# Patient Record
Sex: MALE | Race: WHITE | NOT HISPANIC OR LATINO | URBAN - METROPOLITAN AREA
[De-identification: names, ages, dates, MRNs, and addresses within clinical notes are randomized per-mention and may not be internally consistent; named-entity substitution may affect disease eponyms.]

---

## 2020-07-25 ENCOUNTER — TELEPHONE ENCOUNTER (OUTPATIENT)
Dept: URBAN - METROPOLITAN AREA CLINIC 13 | Facility: CLINIC | Age: 65
End: 2020-07-25

## 2020-07-25 RX ORDER — SULFAMETHOXAZOLE AND TRIMETHOPRIM 800; 160 MG/1; MG/1
TAKE 1 TABLET TWICE DAILY UNTIL FINISHED TABLET ORAL
Qty: 20 | Refills: 0 | OUTPATIENT
Start: 2018-06-19 | End: 2018-07-27

## 2020-07-25 RX ORDER — SODIUM SULFATE, POTASSIUM SULFATE, MAGNESIUM SULFATE 17.5; 3.13; 1.6 G/ML; G/ML; G/ML
5PM THE DAY BEFORE PROCEDURE, DRINK 1/2 OF PREP, THEN 6HOURS BEFORE PROCEDURE DRINK REMAINDER OF PREP SOLUTION, CONCENTRATE ORAL
Qty: 1 | Refills: 0 | OUTPATIENT
Start: 2018-12-11 | End: 2019-03-12

## 2020-07-25 RX ORDER — ETANERCEPT 50 MG/ML
INJECT WEEKLY SOLUTION SUBCUTANEOUS
Qty: 4 | Refills: 0 | OUTPATIENT
Start: 2014-10-09 | End: 2018-06-19

## 2020-07-26 ENCOUNTER — TELEPHONE ENCOUNTER (OUTPATIENT)
Dept: URBAN - METROPOLITAN AREA CLINIC 13 | Facility: CLINIC | Age: 65
End: 2020-07-26

## 2020-07-26 RX ORDER — TAMSULOSIN HYDROCHLORIDE 0.4 MG/1
CAPSULE ORAL
Qty: 30 | Refills: 0 | Status: ACTIVE | COMMUNITY
Start: 2018-08-11

## 2020-07-26 RX ORDER — METAXALONE 800 MG/1
TABLET ORAL
Qty: 60 | Refills: 0 | Status: ACTIVE | COMMUNITY
Start: 2015-03-13

## 2020-07-26 RX ORDER — MELOXICAM 15 MG/1
TABLET ORAL
Qty: 90 | Refills: 0 | Status: ACTIVE | COMMUNITY
Start: 2015-03-07

## 2020-07-26 RX ORDER — OXYCODONE AND ACETAMINOPHEN 5; 325 MG/1; MG/1
TABLET ORAL
Qty: 20 | Refills: 0 | Status: ACTIVE | COMMUNITY
Start: 2018-05-10

## 2020-07-26 RX ORDER — AMLODIPINE BESYLATE AND BENAZEPRIL HYDROCHLORIDE 5; 10 MG/1; MG/1
TAKE 1 CAPSULE DAILY CAPSULE ORAL
Refills: 0 | Status: ACTIVE | COMMUNITY

## 2020-07-26 RX ORDER — CEFDINIR 300 MG/1
CAPSULE ORAL
Qty: 14 | Refills: 0 | Status: ACTIVE | COMMUNITY
Start: 2014-08-22

## 2020-07-26 RX ORDER — CLONAZEPAM 0.5 MG/1
TABLET ORAL
Qty: 30 | Refills: 0 | Status: ACTIVE | COMMUNITY
Start: 2019-03-18

## 2020-07-26 RX ORDER — FLUTICASONE PROPIONATE 50 UG/1
SPRAY, METERED NASAL
Qty: 16 | Refills: 0 | Status: ACTIVE | COMMUNITY
Start: 2014-08-22

## 2020-07-26 RX ORDER — MULTIVITAMIN WITH IRON
TAKE 1 TABLET DAILY, DOSAGE UNKNOWN PER PATIENT TABLET ORAL
Refills: 0 | Status: ACTIVE | COMMUNITY

## 2020-07-26 RX ORDER — BUPROPION HYDROCHLORIDE 300 MG/1
TAKE 1 TABLET DAILY TABLET, EXTENDED RELEASE ORAL
Refills: 0 | Status: ACTIVE | COMMUNITY

## 2020-07-26 RX ORDER — FAMOTIDINE 40 MG/1
TAKE 1 TABLET AT BEDTIME TABLET ORAL
Qty: 90 | Refills: 3 | Status: ACTIVE | COMMUNITY
Start: 2019-09-23

## 2020-07-26 RX ORDER — MELOXICAM 7.5 MG/1
TAKE 1 TABLET DAILY AS NEEDED TABLET ORAL
Refills: 0 | Status: ACTIVE | COMMUNITY

## 2020-07-26 RX ORDER — TAMSULOSIN HYDROCHLORIDE 0.4 MG/1
CAPSULE ORAL
Qty: 30 | Refills: 0 | Status: ACTIVE | COMMUNITY
Start: 2018-07-15

## 2020-07-26 RX ORDER — LEVOFLOXACIN 500 MG/1
TABLET, FILM COATED ORAL
Qty: 3 | Refills: 0 | Status: ACTIVE | COMMUNITY
Start: 2018-02-08

## 2020-07-26 RX ORDER — CYCLOBENZAPRINE HYDROCHLORIDE 10 MG/1
TABLET, FILM COATED ORAL
Qty: 60 | Refills: 0 | Status: ACTIVE | COMMUNITY
Start: 2015-05-05

## 2020-07-26 RX ORDER — CIPROFLOXACIN HYDROCHLORIDE 500 MG/1
TABLET, FILM COATED ORAL
Qty: 7 | Refills: 0 | Status: ACTIVE | COMMUNITY
Start: 2018-05-10

## 2020-07-26 RX ORDER — MUPIROCIN 20 MG/G
OINTMENT TOPICAL
Qty: 22 | Refills: 0 | Status: ACTIVE | COMMUNITY
Start: 2018-03-02

## 2020-07-26 RX ORDER — NAPROXEN 500 MG/1
TABLET, DELAYED RELEASE ORAL
Qty: 60 | Refills: 0 | Status: ACTIVE | COMMUNITY
Start: 2015-02-09

## 2020-07-26 RX ORDER — TAMSULOSIN HYDROCHLORIDE 0.4 MG/1
CAPSULE ORAL
Qty: 30 | Refills: 0 | Status: ACTIVE | COMMUNITY
Start: 2018-09-12

## 2020-07-26 RX ORDER — ATORVASTATIN CALCIUM 20 MG/1
TAKE 1 TABLET DAILY TABLET, FILM COATED ORAL
Refills: 0 | Status: ACTIVE | COMMUNITY
Start: 2014-09-04

## 2020-07-26 RX ORDER — CLONAZEPAM 1 MG/1
TAKE 1/2 TABLET TWICE DAILY AS NEEDED TABLET ORAL
Refills: 0 | Status: ACTIVE | COMMUNITY

## 2020-07-26 RX ORDER — HYDROCODONE BITARTRATE AND ACETAMINOPHEN 7.5; 325 MG/1; MG/1
TAKE 1 TABLET EVERY 4 TO 6 HOURS AS NEEDED FOR PAIN TABLET ORAL
Refills: 0 | Status: ACTIVE | COMMUNITY
Start: 2014-10-05

## 2020-07-26 RX ORDER — CIPROFLOXACIN HYDROCHLORIDE 500 MG/1
TABLET, FILM COATED ORAL
Qty: 60 | Refills: 0 | Status: ACTIVE | COMMUNITY
Start: 2017-12-22

## 2020-07-26 RX ORDER — TAMSULOSIN HYDROCHLORIDE 0.4 MG/1
CAPSULE ORAL
Qty: 30 | Refills: 0 | Status: ACTIVE | COMMUNITY
Start: 2018-06-18

## 2020-07-26 RX ORDER — SULFAMETHOXAZOLE AND TRIMETHOPRIM 800; 160 MG/1; MG/1
TABLET ORAL
Qty: 20 | Refills: 0 | Status: ACTIVE | COMMUNITY
Start: 2018-02-28

## 2021-04-20 ENCOUNTER — TELEPHONE ENCOUNTER (OUTPATIENT)
Dept: URBAN - METROPOLITAN AREA CLINIC 113 | Facility: CLINIC | Age: 66
End: 2021-04-20

## 2021-07-08 ENCOUNTER — WEB ENCOUNTER (OUTPATIENT)
Dept: URBAN - METROPOLITAN AREA CLINIC 113 | Facility: CLINIC | Age: 66
End: 2021-07-08

## 2021-07-08 ENCOUNTER — OFFICE VISIT (OUTPATIENT)
Dept: URBAN - METROPOLITAN AREA CLINIC 113 | Facility: CLINIC | Age: 66
End: 2021-07-08
Payer: MEDICARE

## 2021-07-08 VITALS
SYSTOLIC BLOOD PRESSURE: 135 MMHG | DIASTOLIC BLOOD PRESSURE: 80 MMHG | HEART RATE: 89 BPM | TEMPERATURE: 98.2 F | HEIGHT: 71 IN | BODY MASS INDEX: 25.34 KG/M2 | RESPIRATION RATE: 18 BRPM | WEIGHT: 181 LBS

## 2021-07-08 DIAGNOSIS — D49.0 IPMN (INTRADUCTAL PAPILLARY MUCINOUS NEOPLASM): ICD-10-CM

## 2021-07-08 DIAGNOSIS — Z80.0 FAMILY HISTORY OF PANCREATIC CANCER: ICD-10-CM

## 2021-07-08 DIAGNOSIS — Z86.010 HISTORY OF COLON POLYPS: ICD-10-CM

## 2021-07-08 DIAGNOSIS — K21.00 GASTROESOPHAGEAL REFLUX DISEASE WITH ESOPHAGITIS WITHOUT HEMORRHAGE: ICD-10-CM

## 2021-07-08 PROCEDURE — 99213 OFFICE O/P EST LOW 20 MIN: CPT | Performed by: INTERNAL MEDICINE

## 2021-07-08 RX ORDER — HYDROCODONE BITARTRATE AND ACETAMINOPHEN 7.5; 325 MG/1; MG/1
TAKE 1 TABLET EVERY 4 TO 6 HOURS AS NEEDED FOR PAIN TABLET ORAL
Refills: 0 | Status: ACTIVE | COMMUNITY
Start: 2014-10-05

## 2021-07-08 RX ORDER — AMLODIPINE BESYLATE AND BENAZEPRIL HYDROCHLORIDE 5; 10 MG/1; MG/1
TAKE 1 CAPSULE DAILY CAPSULE ORAL
Refills: 0 | Status: ACTIVE | COMMUNITY

## 2021-07-08 RX ORDER — MELOXICAM 7.5 MG/1
TAKE 1 TABLET DAILY AS NEEDED TABLET ORAL
Refills: 0 | Status: ACTIVE | COMMUNITY

## 2021-07-08 RX ORDER — CIPROFLOXACIN HYDROCHLORIDE 500 MG/1
TABLET, FILM COATED ORAL
Qty: 60 | Refills: 0 | Status: ON HOLD | COMMUNITY
Start: 2017-12-22

## 2021-07-08 RX ORDER — NAPROXEN 500 MG/1
TABLET, DELAYED RELEASE ORAL
Qty: 60 | Refills: 0 | Status: ON HOLD | COMMUNITY
Start: 2015-02-09

## 2021-07-08 RX ORDER — FAMOTIDINE 40 MG/1
TAKE 1 TABLET AT BEDTIME TABLET ORAL
Qty: 90 | Refills: 3 | Status: ACTIVE | COMMUNITY
Start: 2019-09-23

## 2021-07-08 RX ORDER — MELOXICAM 15 MG/1
TABLET ORAL
Qty: 90 | Refills: 0 | Status: ON HOLD | COMMUNITY
Start: 2015-03-07

## 2021-07-08 RX ORDER — SULFAMETHOXAZOLE AND TRIMETHOPRIM 800; 160 MG/1; MG/1
TABLET ORAL
Qty: 20 | Refills: 0 | Status: ON HOLD | COMMUNITY
Start: 2018-02-28

## 2021-07-08 RX ORDER — ATORVASTATIN CALCIUM 20 MG/1
TAKE 1 TABLET DAILY TABLET, FILM COATED ORAL
Refills: 0 | Status: ACTIVE | COMMUNITY
Start: 2014-09-04

## 2021-07-08 RX ORDER — MUPIROCIN 20 MG/G
OINTMENT TOPICAL
Qty: 22 | Refills: 0 | Status: ON HOLD | COMMUNITY
Start: 2018-03-02

## 2021-07-08 RX ORDER — CLONAZEPAM 0.5 MG/1
TABLET ORAL
Qty: 30 | Refills: 0 | Status: ON HOLD | COMMUNITY
Start: 2019-03-18

## 2021-07-08 RX ORDER — MULTIVITAMIN WITH IRON
TAKE 1 TABLET DAILY, DOSAGE UNKNOWN PER PATIENT TABLET ORAL
Refills: 0 | Status: ACTIVE | COMMUNITY

## 2021-07-08 RX ORDER — BUPROPION HYDROCHLORIDE 300 MG/1
TAKE 1 TABLET DAILY TABLET, EXTENDED RELEASE ORAL
Refills: 0 | Status: ACTIVE | COMMUNITY

## 2021-07-08 RX ORDER — METAXALONE 800 MG/1
TABLET ORAL
Qty: 60 | Refills: 0 | Status: ON HOLD | COMMUNITY
Start: 2015-03-13

## 2021-07-08 RX ORDER — LEVOFLOXACIN 500 MG/1
TABLET, FILM COATED ORAL
Qty: 3 | Refills: 0 | Status: ON HOLD | COMMUNITY
Start: 2018-02-08

## 2021-07-08 RX ORDER — FLUTICASONE PROPIONATE 50 UG/1
SPRAY, METERED NASAL
Qty: 16 | Refills: 0 | Status: ACTIVE | COMMUNITY
Start: 2014-08-22

## 2021-07-08 RX ORDER — TAMSULOSIN HYDROCHLORIDE 0.4 MG/1
CAPSULE ORAL
Qty: 30 | Refills: 0 | Status: ON HOLD | COMMUNITY
Start: 2018-06-18

## 2021-07-08 RX ORDER — CEFDINIR 300 MG/1
CAPSULE ORAL
Qty: 14 | Refills: 0 | Status: ON HOLD | COMMUNITY
Start: 2014-08-22

## 2021-07-08 RX ORDER — CLONAZEPAM 1 MG/1
TAKE 1/2 TABLET TWICE DAILY AS NEEDED TABLET ORAL
Refills: 0 | Status: ON HOLD | COMMUNITY

## 2021-07-08 RX ORDER — CYCLOBENZAPRINE HYDROCHLORIDE 10 MG/1
TABLET, FILM COATED ORAL
Qty: 60 | Refills: 0 | Status: ON HOLD | COMMUNITY
Start: 2015-05-05

## 2021-07-08 RX ORDER — OXYCODONE AND ACETAMINOPHEN 5; 325 MG/1; MG/1
TABLET ORAL
Qty: 20 | Refills: 0 | Status: ACTIVE | COMMUNITY
Start: 2018-05-10

## 2021-07-08 NOTE — HPI-TODAY'S VISIT:
The patient is a very pleasant 65-year-old gentleman who I last saw in March 2019 in the office.  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  I had also followed him for screening colonoscopy.  Last colonoscopy was March 2019.  This was for history of colon polyps.  This revealed a diminutive descending colon polyp and diverticulosis.  Recommendation was for a 5-year follow-up which would be in 2024.  Last upper endoscopy was in July 2018.  This was for dysphagia.  This revealed grade B reflux esophagitis otherwise unremarkable exam.  MRI in March 2019 revealed a 1.6 cm and a 1.5 cm pancreatic cyst.  He had repeat MRI in May of this year.  This revealed a 2.1 cm pancreatic cyst.  And another 12 mm pancreatic cyst.  These were felt to be stable compared to the prior MRI. From a gastrointestinal standpoint the patient is doing well.  His only issues have been neck issues.  He is undergone surgery on his neck.  He seemed to undergo physical therapy in this area.  His mother was diagnosed with pancreatic adenocarcinoma which she passed from.  He is very concerned about this.  We discussed the possibilities in patients of having both IPMN and pancreatic adenocarcinoma as they are both quite common.

## 2021-07-08 NOTE — EXAM-PHYSICAL EXAM
He is alert and oriented to person place and situation no acute distress.  There is no scleral icterus.

## 2021-09-03 ENCOUNTER — ERX REFILL RESPONSE (OUTPATIENT)
Dept: URBAN - METROPOLITAN AREA CLINIC 113 | Facility: CLINIC | Age: 66
End: 2021-09-03

## 2021-09-03 RX ORDER — FAMOTIDINE 40 MG/1
TAKE 1 TABLET AT BEDTIME TABLET, FILM COATED ORAL
Qty: 90 TABLET | Refills: 0 | OUTPATIENT

## 2021-09-03 RX ORDER — FAMOTIDINE 40 MG/1
TAKE 1 TABLET AT BEDTIME TABLET, FILM COATED ORAL
Qty: 90 TABLET | Refills: 1 | OUTPATIENT

## 2021-10-29 ENCOUNTER — ERX REFILL RESPONSE (OUTPATIENT)
Dept: URBAN - METROPOLITAN AREA CLINIC 113 | Facility: CLINIC | Age: 66
End: 2021-10-29

## 2021-10-29 RX ORDER — FAMOTIDINE 40 MG/1
TAKE 1 TABLET AT BEDTIME TABLET, FILM COATED ORAL
Qty: 90 TABLET | Refills: 1 | OUTPATIENT

## 2021-12-01 ENCOUNTER — ERX REFILL RESPONSE (OUTPATIENT)
Dept: URBAN - METROPOLITAN AREA CLINIC 113 | Facility: CLINIC | Age: 66
End: 2021-12-01

## 2021-12-01 RX ORDER — FAMOTIDINE 40 MG/1
TAKE 1 TABLET AT BEDTIME TABLET, FILM COATED ORAL
Qty: 90 TABLET | Refills: 1 | OUTPATIENT

## 2022-03-21 ENCOUNTER — ERX REFILL RESPONSE (OUTPATIENT)
Dept: URBAN - METROPOLITAN AREA CLINIC 113 | Facility: CLINIC | Age: 67
End: 2022-03-21

## 2022-03-21 RX ORDER — FAMOTIDINE 40 MG/1
TAKE 1 TABLET AT BEDTIME TABLET, FILM COATED ORAL
Qty: 90 TABLET | Refills: 1 | OUTPATIENT

## 2022-04-26 ENCOUNTER — ERX REFILL RESPONSE (OUTPATIENT)
Dept: URBAN - METROPOLITAN AREA CLINIC 113 | Facility: CLINIC | Age: 67
End: 2022-04-26

## 2022-04-26 RX ORDER — FAMOTIDINE 40 MG/1
TAKE 1 TABLET AT BEDTIME TABLET, FILM COATED ORAL
Qty: 90 TABLET | Refills: 1 | OUTPATIENT

## 2022-07-05 ENCOUNTER — OFFICE VISIT (OUTPATIENT)
Dept: URBAN - METROPOLITAN AREA CLINIC 107 | Facility: CLINIC | Age: 67
End: 2022-07-05
Payer: MEDICARE

## 2022-07-05 VITALS
WEIGHT: 180 LBS | TEMPERATURE: 98.2 F | HEART RATE: 80 BPM | BODY MASS INDEX: 25.2 KG/M2 | HEIGHT: 71 IN | DIASTOLIC BLOOD PRESSURE: 85 MMHG | SYSTOLIC BLOOD PRESSURE: 145 MMHG

## 2022-07-05 DIAGNOSIS — K21.00 GASTROESOPHAGEAL REFLUX DISEASE WITH ESOPHAGITIS WITHOUT HEMORRHAGE: ICD-10-CM

## 2022-07-05 DIAGNOSIS — D49.0 IPMN (INTRADUCTAL PAPILLARY MUCINOUS NEOPLASM): ICD-10-CM

## 2022-07-05 DIAGNOSIS — Z80.0 FAMILY HISTORY OF PANCREATIC CANCER: ICD-10-CM

## 2022-07-05 DIAGNOSIS — Z86.010 HISTORY OF COLON POLYPS: ICD-10-CM

## 2022-07-05 PROCEDURE — 99203 OFFICE O/P NEW LOW 30 MIN: CPT | Performed by: NURSE PRACTITIONER

## 2022-07-05 RX ORDER — FAMOTIDINE 40 MG/1
TAKE 1 TABLET AT BEDTIME TABLET, FILM COATED ORAL
Qty: 90 TABLET | Refills: 1 | Status: ACTIVE | COMMUNITY

## 2022-07-05 RX ORDER — OXYCODONE AND ACETAMINOPHEN 5; 325 MG/1; MG/1
TABLET ORAL
Qty: 20 | Refills: 0 | Status: ACTIVE | COMMUNITY
Start: 2018-05-10

## 2022-07-05 RX ORDER — BUPROPION HYDROCHLORIDE 300 MG/1
TAKE 1 TABLET DAILY TABLET, EXTENDED RELEASE ORAL
Refills: 0 | Status: ACTIVE | COMMUNITY

## 2022-07-05 RX ORDER — MELOXICAM 7.5 MG/1
TAKE 1 TABLET DAILY AS NEEDED TABLET ORAL
Refills: 0 | Status: ACTIVE | COMMUNITY

## 2022-07-05 RX ORDER — ATORVASTATIN CALCIUM 20 MG/1
TAKE 1 TABLET DAILY TABLET, FILM COATED ORAL
Refills: 0 | Status: ACTIVE | COMMUNITY
Start: 2014-09-04

## 2022-07-05 RX ORDER — FLUTICASONE PROPIONATE 50 UG/1
SPRAY, METERED NASAL
Qty: 16 | Refills: 0 | Status: ACTIVE | COMMUNITY
Start: 2014-08-22

## 2022-07-05 RX ORDER — AMLODIPINE BESYLATE AND BENAZEPRIL HYDROCHLORIDE 5; 10 MG/1; MG/1
TAKE 1 CAPSULE DAILY CAPSULE ORAL
Refills: 0 | Status: ACTIVE | COMMUNITY

## 2022-07-05 NOTE — HPI-TODAY'S VISIT:
This is a 66-year-old male with a history of hypertension, hyperlipidemia, psoriatic arthritis, prostate cancer, pancreatic IPMN's (initially found 2015), and adenomatous colon polyps due surveillance in March 2024 presenting for evaluation of abdominal pain. He was last seen in the office 3/27/2019.  IPMN's were stable on imaging.  He had recently undergone a surveillance colonoscopy demonstrating sigmoid diverticulosis and removal of a 3 mm hyperplastic polyp.  Due to his prior history of adenomas, surveillance was recommended in 5 years.  He states he is doing well.  He had a sensation of "squeezing inside and hurting" in his chest and upper body lasting 2 days and spontaneously resolving.  He also reports a chest x-ray that was performed prior to a recent MRI.  His lungs were clear. He reports a nuclear stress test 4 years ago notable for mild vessel disease for which he has been instructed to take low-dose aspirin.  This symptom has not recurred.  GERD is controlled with famotidine 40 mg at bedtime.  He denies any abdominal symptoms.

## 2022-07-05 NOTE — HPI-OTHER HISTORIES
Labs 6/10/2022:CBC: WBC 5.4, hemoglobin 16.3, MCV 88, platelet 196.  D-dimer 0.62. MRI of the abdomen with and without contrast 6/30/2022:Stable pancreatic cystic lesions including the 2 dominant lobulated and septated cystic lesions in the pancreatic body followed by the pancreatic head as previously delineated on the abdominal MRI of 5/14/2021.  No overtly concerning postcontrast features.  No infiltrative morphology, main duct dilation, acute pancreatitis, biliary ductal obstruction, or findings concerning for metastatic disease.  Benign-appearing bilateral renal cysts are identified, the dominant Bosniak type I cyst in the left kidney currently measures 2.6 cm, previously 2.3 cm.  Chronic ancillary findings: Minimal hepatic steatosis, age-related degenerative changes of the osseous structures, convex right lumbar scoliosis with multilevel signal changes. Colonoscopy 3/12/2019:BB PS 6, removal of a 3 mm descending sessile hyperplastic polyp, sigmoid diverticulosis. EGD 7/27/2018:Normal duodenum, normal stomach, LA grade B reflux esophagitis.

## 2022-07-09 ENCOUNTER — TELEPHONE ENCOUNTER (OUTPATIENT)
Dept: URBAN - METROPOLITAN AREA CLINIC 113 | Facility: CLINIC | Age: 67
End: 2022-07-09

## 2022-09-27 ENCOUNTER — TELEPHONE ENCOUNTER (OUTPATIENT)
Dept: URBAN - METROPOLITAN AREA CLINIC 113 | Facility: CLINIC | Age: 67
End: 2022-09-27

## 2022-10-11 ENCOUNTER — LAB OUTSIDE AN ENCOUNTER (OUTPATIENT)
Dept: URBAN - METROPOLITAN AREA CLINIC 113 | Facility: CLINIC | Age: 67
End: 2022-10-11

## 2022-10-11 ENCOUNTER — CLAIMS CREATED FROM THE CLAIM WINDOW (OUTPATIENT)
Dept: URBAN - METROPOLITAN AREA CLINIC 113 | Facility: CLINIC | Age: 67
End: 2022-10-11
Payer: MEDICARE

## 2022-10-11 ENCOUNTER — CLAIMS CREATED FROM THE CLAIM WINDOW (OUTPATIENT)
Dept: URBAN - METROPOLITAN AREA CLINIC 113 | Facility: CLINIC | Age: 67
End: 2022-10-11

## 2022-10-11 ENCOUNTER — OFFICE VISIT (OUTPATIENT)
Dept: URBAN - METROPOLITAN AREA CLINIC 113 | Facility: CLINIC | Age: 67
End: 2022-10-11

## 2022-10-11 VITALS
WEIGHT: 184 LBS | RESPIRATION RATE: 20 BRPM | SYSTOLIC BLOOD PRESSURE: 114 MMHG | DIASTOLIC BLOOD PRESSURE: 71 MMHG | BODY MASS INDEX: 25.76 KG/M2 | TEMPERATURE: 98 F | HEART RATE: 77 BPM | HEIGHT: 71 IN

## 2022-10-11 DIAGNOSIS — D49.0 IPMN (INTRADUCTAL PAPILLARY MUCINOUS NEOPLASM): ICD-10-CM

## 2022-10-11 DIAGNOSIS — K21.00 GASTROESOPHAGEAL REFLUX DISEASE WITH ESOPHAGITIS WITHOUT HEMORRHAGE: ICD-10-CM

## 2022-10-11 DIAGNOSIS — Z86.010 HISTORY OF COLON POLYPS: ICD-10-CM

## 2022-10-11 DIAGNOSIS — R19.8 RECTAL PRESSURE: ICD-10-CM

## 2022-10-11 DIAGNOSIS — R10.32 LLQ DISCOMFORT: ICD-10-CM

## 2022-10-11 DIAGNOSIS — D49.0 ANAL NEOPLASM: ICD-10-CM

## 2022-10-11 DIAGNOSIS — K64.4 ANAL SKIN TAG: ICD-10-CM

## 2022-10-11 PROBLEM — 429000004: Status: ACTIVE | Noted: 2022-07-05

## 2022-10-11 PROCEDURE — 99213 OFFICE O/P EST LOW 20 MIN: CPT

## 2022-10-11 RX ORDER — AMLODIPINE BESYLATE AND BENAZEPRIL HYDROCHLORIDE 5; 10 MG/1; MG/1
TAKE 1 CAPSULE DAILY CAPSULE ORAL
Refills: 0 | Status: ACTIVE | COMMUNITY

## 2022-10-11 RX ORDER — FLUTICASONE PROPIONATE 50 UG/1
SPRAY, METERED NASAL
Qty: 16 | Refills: 0 | Status: ACTIVE | COMMUNITY
Start: 2014-08-22

## 2022-10-11 RX ORDER — BUPROPION HYDROCHLORIDE 300 MG/1
TAKE 1 TABLET DAILY TABLET, EXTENDED RELEASE ORAL
Refills: 0 | Status: ACTIVE | COMMUNITY

## 2022-10-11 RX ORDER — FAMOTIDINE 40 MG/1
TAKE 1 TABLET AT BEDTIME TABLET, FILM COATED ORAL
Qty: 90 TABLET | Refills: 1 | Status: ACTIVE | COMMUNITY

## 2022-10-11 RX ORDER — OXYCODONE AND ACETAMINOPHEN 5; 325 MG/1; MG/1
TABLET ORAL
Qty: 20 | Refills: 0 | Status: ON HOLD | COMMUNITY
Start: 2018-05-10

## 2022-10-11 RX ORDER — MELOXICAM 7.5 MG/1
TAKE 1 TABLET DAILY AS NEEDED TABLET ORAL
Refills: 0 | Status: ACTIVE | COMMUNITY

## 2022-10-11 RX ORDER — ATORVASTATIN CALCIUM 20 MG/1
TAKE 1 TABLET DAILY TABLET, FILM COATED ORAL
Refills: 0 | Status: ACTIVE | COMMUNITY
Start: 2014-09-04

## 2022-10-11 NOTE — PHYSICAL EXAM HENT:
Head, normocephalic, atraumatic, Face, Face within normal limits, Ears, External ears within normal limits Referred To Asc For Closure Text (Leave Blank If You Do Not Want): After obtaining clear surgical margins the patient was sent to an ASC for surgical repair.  The patient understands they will receive post-surgical care and follow-up from the ASC physician.

## 2022-10-11 NOTE — HPI-TODAY'S VISIT:
This is a 66-year-old male with a history of hypertension, hyperlipidemia, psoriatic arthritis, prostate cancer, pancreatic IPMN's (initially found 2015), and adenomatous colon polyps due surveillance in March 2024 presenting for evaluation of rectal discomfort. . He was last seen in the office 7/5/22 his GERD was controlled with famotidine 40 mg at bedtime. Pancreatic cystic lesions determined likely to be IPMN's. Recent MRI demonstrated minimal increase (2 mm) of 2 dominant lesions. Radiology has interpreted stable findings. Recommend repeat MRI in 1 year. Today he presents with 4 weeks of  an "odd feeling in rectum or something stuck in rectum". He does not have any pain, just discomfort. The sensation happens multiple times a day and will improve after a bowel movement, but always comes back. He reports no constipation or straining with bowel movements 2-3 times per day. Denies any blood noted in stool or on paper. He did have prostate cancer and radiation treatment back in 2018. He has never experienced this sensation before. He also reports intermittent LLQ pressure. This sensation seems to happen occasionally prior to bowel movements. There is no pain, just some discomfort.   Interval History.   IPMN's were stable on imaging.  He had recently undergone a surveillance colonoscopy in 2017 demonstrating sigmoid diverticulosis and removal of a 3 mm hyperplastic polyp.  Due to his prior history of adenomas, surveillance was recommended in 5 years.

## 2022-10-17 ENCOUNTER — TELEPHONE ENCOUNTER (OUTPATIENT)
Dept: URBAN - METROPOLITAN AREA CLINIC 113 | Facility: CLINIC | Age: 67
End: 2022-10-17

## 2022-10-20 ENCOUNTER — ERX REFILL RESPONSE (OUTPATIENT)
Dept: URBAN - METROPOLITAN AREA CLINIC 113 | Facility: CLINIC | Age: 67
End: 2022-10-20

## 2022-10-20 RX ORDER — FAMOTIDINE 40 MG/1
TAKE 1 TABLET AT BEDTIME TABLET, FILM COATED ORAL
Qty: 90 TABLET | Refills: 1 | OUTPATIENT

## 2022-10-20 RX ORDER — FAMOTIDINE 40 MG/1
TAKE 1 TABLET AT BEDTIME (APPOINTMENT IS NEEDED FOR REFILLS) TABLET, FILM COATED ORAL
Qty: 90 TABLET | Refills: 3 | OUTPATIENT

## 2022-10-24 ENCOUNTER — OFFICE VISIT (OUTPATIENT)
Dept: URBAN - METROPOLITAN AREA SURGERY CENTER 25 | Facility: SURGERY CENTER | Age: 67
End: 2022-10-24
Payer: MEDICARE

## 2022-10-24 ENCOUNTER — CLAIMS CREATED FROM THE CLAIM WINDOW (OUTPATIENT)
Dept: URBAN - METROPOLITAN AREA CLINIC 4 | Facility: CLINIC | Age: 67
End: 2022-10-24
Payer: MEDICARE

## 2022-10-24 DIAGNOSIS — D12.5 BENIGN NEOPLASM OF SIGMOID COLON: ICD-10-CM

## 2022-10-24 DIAGNOSIS — D12.5 ADENOMA OF SIGMOID COLON: ICD-10-CM

## 2022-10-24 DIAGNOSIS — K62.89 RECTAL PAIN: ICD-10-CM

## 2022-10-24 PROCEDURE — 45338 SIGMOIDOSCOPY W/TUMR REMOVE: CPT | Performed by: INTERNAL MEDICINE

## 2022-10-24 PROCEDURE — G8907 PT DOC NO EVENTS ON DISCHARG: HCPCS | Performed by: INTERNAL MEDICINE

## 2022-10-24 PROCEDURE — 88305 TISSUE EXAM BY PATHOLOGIST: CPT | Performed by: PATHOLOGY

## 2022-10-24 RX ORDER — BUPROPION HYDROCHLORIDE 300 MG/1
TAKE 1 TABLET DAILY TABLET, EXTENDED RELEASE ORAL
Refills: 0 | Status: ACTIVE | COMMUNITY

## 2022-10-24 RX ORDER — AMLODIPINE BESYLATE AND BENAZEPRIL HYDROCHLORIDE 5; 10 MG/1; MG/1
TAKE 1 CAPSULE DAILY CAPSULE ORAL
Refills: 0 | Status: ACTIVE | COMMUNITY

## 2022-10-24 RX ORDER — OXYCODONE AND ACETAMINOPHEN 5; 325 MG/1; MG/1
TABLET ORAL
Qty: 20 | Refills: 0 | Status: ON HOLD | COMMUNITY
Start: 2018-05-10

## 2022-10-24 RX ORDER — FAMOTIDINE 40 MG/1
TAKE 1 TABLET AT BEDTIME (APPOINTMENT IS NEEDED FOR REFILLS) TABLET, FILM COATED ORAL
Qty: 90 TABLET | Refills: 3 | Status: ACTIVE | COMMUNITY

## 2022-10-24 RX ORDER — MELOXICAM 7.5 MG/1
TAKE 1 TABLET DAILY AS NEEDED TABLET ORAL
Refills: 0 | Status: ACTIVE | COMMUNITY

## 2022-10-24 RX ORDER — ATORVASTATIN CALCIUM 20 MG/1
TAKE 1 TABLET DAILY TABLET, FILM COATED ORAL
Refills: 0 | Status: ACTIVE | COMMUNITY
Start: 2014-09-04

## 2022-10-24 RX ORDER — FLUTICASONE PROPIONATE 50 UG/1
SPRAY, METERED NASAL
Qty: 16 | Refills: 0 | Status: ACTIVE | COMMUNITY
Start: 2014-08-22

## 2022-10-31 ENCOUNTER — TELEPHONE ENCOUNTER (OUTPATIENT)
Dept: URBAN - METROPOLITAN AREA CLINIC 113 | Facility: CLINIC | Age: 67
End: 2022-10-31

## 2022-12-06 ENCOUNTER — OFFICE VISIT (OUTPATIENT)
Dept: URBAN - METROPOLITAN AREA CLINIC 113 | Facility: CLINIC | Age: 67
End: 2022-12-06
Payer: MEDICARE

## 2022-12-06 ENCOUNTER — DASHBOARD ENCOUNTERS (OUTPATIENT)
Age: 67
End: 2022-12-06

## 2022-12-06 VITALS
HEIGHT: 71 IN | BODY MASS INDEX: 25.9 KG/M2 | HEART RATE: 83 BPM | TEMPERATURE: 97.5 F | SYSTOLIC BLOOD PRESSURE: 127 MMHG | RESPIRATION RATE: 16 BRPM | WEIGHT: 185 LBS | DIASTOLIC BLOOD PRESSURE: 80 MMHG

## 2022-12-06 DIAGNOSIS — K64.4 ANAL SKIN TAG: ICD-10-CM

## 2022-12-06 DIAGNOSIS — R10.32 LLQ DISCOMFORT: ICD-10-CM

## 2022-12-06 DIAGNOSIS — Z86.010 HISTORY OF COLON POLYPS: ICD-10-CM

## 2022-12-06 DIAGNOSIS — K21.00 GASTROESOPHAGEAL REFLUX DISEASE WITH ESOPHAGITIS WITHOUT HEMORRHAGE: ICD-10-CM

## 2022-12-06 DIAGNOSIS — R19.8 RECTAL PRESSURE: ICD-10-CM

## 2022-12-06 DIAGNOSIS — D49.0 IPMN (INTRADUCTAL PAPILLARY MUCINOUS NEOPLASM): ICD-10-CM

## 2022-12-06 DIAGNOSIS — D12.6 TUBULAR ADENOMA OF COLON: ICD-10-CM

## 2022-12-06 PROBLEM — 266433003: Status: ACTIVE | Noted: 2022-07-05

## 2022-12-06 PROBLEM — 92264007: Status: ACTIVE | Noted: 2022-07-05

## 2022-12-06 PROCEDURE — 99214 OFFICE O/P EST MOD 30 MIN: CPT

## 2022-12-06 RX ORDER — BUPROPION HYDROCHLORIDE 300 MG/1
TAKE 1 TABLET DAILY TABLET, EXTENDED RELEASE ORAL
Refills: 0 | Status: ACTIVE | COMMUNITY

## 2022-12-06 RX ORDER — AMLODIPINE BESYLATE AND BENAZEPRIL HYDROCHLORIDE 5; 10 MG/1; MG/1
TAKE 1 CAPSULE DAILY CAPSULE ORAL
Refills: 0 | Status: ACTIVE | COMMUNITY

## 2022-12-06 RX ORDER — FLUTICASONE PROPIONATE 50 UG/1
SPRAY, METERED NASAL
Qty: 16 | Refills: 0 | Status: ACTIVE | COMMUNITY
Start: 2014-08-22

## 2022-12-06 RX ORDER — TAMSULOSIN HYDROCHLORIDE 0.4 MG/1
1 CAPSULE CAPSULE ORAL ONCE A DAY
Status: ACTIVE | COMMUNITY

## 2022-12-06 RX ORDER — OXYCODONE AND ACETAMINOPHEN 5; 325 MG/1; MG/1
TABLET ORAL
Qty: 20 | Refills: 0 | Status: ON HOLD | COMMUNITY
Start: 2018-05-10

## 2022-12-06 RX ORDER — FAMOTIDINE 40 MG/1
TAKE 1 TABLET AT BEDTIME (APPOINTMENT IS NEEDED FOR REFILLS) TABLET, FILM COATED ORAL
Qty: 90 TABLET | Refills: 3 | Status: ACTIVE | COMMUNITY

## 2022-12-06 RX ORDER — ATORVASTATIN CALCIUM 20 MG/1
TAKE 1 TABLET DAILY TABLET, FILM COATED ORAL
Refills: 0 | Status: ACTIVE | COMMUNITY
Start: 2014-09-04

## 2022-12-06 RX ORDER — MELOXICAM 7.5 MG/1
TAKE 1 TABLET DAILY AS NEEDED TABLET ORAL
Refills: 0 | Status: ACTIVE | COMMUNITY

## 2022-12-06 NOTE — HPI-TODAY'S VISIT:
This is a 66-year-old male with a history of hypertension, hyperlipidemia, psoriatic arthritis, prostate cancer, pancreatic IPMN's (initially found 2015), and adenomatous colon polyps due surveillance in March 2024 presenting for evaluation of rectal discomfort. . He was last seen in the office 7/5/22 his GERD was controlled with famotidine 40 mg at bedtime. Pancreatic cystic lesions determined likely to be IPMN's. Recent MRI demonstrated minimal increase (2 mm) of 2 dominant lesions. Radiology has interpreted stable findings. Recommend repeat MRI in 1 year. Today he presents with 4 weeks of  an "odd feeling in rectum or something stuck in rectum". He does not have any pain, just discomfort. The sensation happens multiple times a day and will improve after a bowel movement, but always comes back. He reports no constipation or straining with bowel movements 2-3 times per day. Denies any blood noted in stool or on paper. He did have prostate cancer and radiation treatment back in 2018. He has never experienced this sensation before. He also reports intermittent LLQ pressure. This sensation seems to happen occasionally prior to bowel movements. There is no pain, just some discomfort.   Interval History.   IPMN's were stable on imaging.  He had recently undergone a surveillance colonoscopy in 2017 demonstrating sigmoid diverticulosis and removal of a 3 mm hyperplastic polyp.  Due to his prior history of adenomas, surveillance was recommended in 5 years.  Interval history: 67-year-old male presents for follow-up after flexible sigmoidoscopy.  He was last seen on 10/11/2022.  He reported rectal pressure and sensation of foreign object in his rectum ongoing for 4 weeks.  CATHRYN was notable for external skin tag.  He does have a notable history of prostate cancer status post radiation therapy in 2018.  He was recommended to increase fiber to 2 tablespoons daily.  He was scheduled for flexible sigmoidoscopy for further evaluation.  Flexible sigmoidoscopy 10/24/2022:Performed without difficulty.  Quality bowel prep was good.  Perianal digital rectal exams were normal.  Removal of a 6 mm polyp in the mid sigmoid colon.  Diverticulosis in the sigmoid colon.  Recommendations included performing a colonoscopy if polyp is adenomatous.  Pathology revealed a tubular adenoma.  A colonoscopy was recommended as there is a 30% chance of additional polyps throughout the remainder of the colon.  He was also recommended psyllium husk 2 tablespoons daily.  He disliked the psyllium husk. THis made his stools mushy and the texture was distasteful. He typically has 1-2 bowel movements daily. He has not experienced the rectal pressure since his flex sig. He denies abdominal pain, nausea, vomiting or dysphagia. GERD is well controlled. NO blood per rectum. His mother had pancreatic cancer.

## 2023-01-04 PROBLEM — 444898006: Status: ACTIVE | Noted: 2023-01-04

## 2023-01-04 PROBLEM — 428283002: Status: ACTIVE | Noted: 2021-07-08

## 2023-01-05 ENCOUNTER — TELEPHONE ENCOUNTER (OUTPATIENT)
Dept: URBAN - METROPOLITAN AREA CLINIC 113 | Facility: CLINIC | Age: 68
End: 2023-01-05

## 2023-01-19 ENCOUNTER — CLAIMS CREATED FROM THE CLAIM WINDOW (OUTPATIENT)
Dept: URBAN - METROPOLITAN AREA SURGERY CENTER 25 | Facility: SURGERY CENTER | Age: 68
End: 2023-01-19
Payer: MEDICARE

## 2023-01-19 ENCOUNTER — CLAIMS CREATED FROM THE CLAIM WINDOW (OUTPATIENT)
Dept: URBAN - METROPOLITAN AREA CLINIC 4 | Facility: CLINIC | Age: 68
End: 2023-01-19
Payer: MEDICARE

## 2023-01-19 ENCOUNTER — CLAIMS CREATED FROM THE CLAIM WINDOW (OUTPATIENT)
Dept: URBAN - METROPOLITAN AREA SURGERY CENTER 25 | Facility: SURGERY CENTER | Age: 68
End: 2023-01-19

## 2023-01-19 DIAGNOSIS — K63.89 OTHER SPECIFIED DISEASES OF INTESTINE: ICD-10-CM

## 2023-01-19 DIAGNOSIS — Z86.010 ADENOMAS PERSONAL HISTORY OF COLONIC POLYPS: ICD-10-CM

## 2023-01-19 PROCEDURE — 45385 COLONOSCOPY W/LESION REMOVAL: CPT | Performed by: INTERNAL MEDICINE

## 2023-01-19 PROCEDURE — G8907 PT DOC NO EVENTS ON DISCHARG: HCPCS | Performed by: INTERNAL MEDICINE

## 2023-01-19 PROCEDURE — 88305 TISSUE EXAM BY PATHOLOGIST: CPT | Performed by: PATHOLOGY

## 2023-01-19 RX ORDER — ATORVASTATIN CALCIUM 20 MG/1
TAKE 1 TABLET DAILY TABLET, FILM COATED ORAL
Refills: 0 | Status: ACTIVE | COMMUNITY
Start: 2014-09-04

## 2023-01-19 RX ORDER — AMLODIPINE BESYLATE AND BENAZEPRIL HYDROCHLORIDE 5; 10 MG/1; MG/1
TAKE 1 CAPSULE DAILY CAPSULE ORAL
Refills: 0 | Status: ACTIVE | COMMUNITY

## 2023-01-19 RX ORDER — BUPROPION HYDROCHLORIDE 300 MG/1
TAKE 1 TABLET DAILY TABLET, EXTENDED RELEASE ORAL
Refills: 0 | Status: ACTIVE | COMMUNITY

## 2023-01-19 RX ORDER — TAMSULOSIN HYDROCHLORIDE 0.4 MG/1
1 CAPSULE CAPSULE ORAL ONCE A DAY
Status: ACTIVE | COMMUNITY

## 2023-01-19 RX ORDER — OXYCODONE AND ACETAMINOPHEN 5; 325 MG/1; MG/1
TABLET ORAL
Qty: 20 | Refills: 0 | Status: ON HOLD | COMMUNITY
Start: 2018-05-10

## 2023-01-19 RX ORDER — FLUTICASONE PROPIONATE 50 UG/1
SPRAY, METERED NASAL
Qty: 16 | Refills: 0 | Status: ACTIVE | COMMUNITY
Start: 2014-08-22

## 2023-01-19 RX ORDER — FAMOTIDINE 40 MG/1
TAKE 1 TABLET AT BEDTIME (APPOINTMENT IS NEEDED FOR REFILLS) TABLET, FILM COATED ORAL
Qty: 90 TABLET | Refills: 3 | Status: ACTIVE | COMMUNITY

## 2023-01-19 RX ORDER — MELOXICAM 7.5 MG/1
TAKE 1 TABLET DAILY AS NEEDED TABLET ORAL
Refills: 0 | Status: ACTIVE | COMMUNITY

## 2023-02-07 PROBLEM — 428283002 HISTORY OF POLYP OF COLON: Status: ACTIVE | Noted: 2023-01-26

## 2023-02-09 ENCOUNTER — OFFICE VISIT (OUTPATIENT)
Dept: URBAN - METROPOLITAN AREA SURGERY CENTER 25 | Facility: SURGERY CENTER | Age: 68
End: 2023-02-09

## 2023-03-06 ENCOUNTER — OFFICE VISIT (OUTPATIENT)
Dept: URBAN - METROPOLITAN AREA CLINIC 113 | Facility: CLINIC | Age: 68
End: 2023-03-06

## 2023-05-15 ENCOUNTER — LAB OUTSIDE AN ENCOUNTER (OUTPATIENT)
Dept: URBAN - METROPOLITAN AREA CLINIC 107 | Facility: CLINIC | Age: 68
End: 2023-05-15

## 2023-06-02 ENCOUNTER — TELEPHONE ENCOUNTER (OUTPATIENT)
Dept: URBAN - METROPOLITAN AREA CLINIC 113 | Facility: CLINIC | Age: 68
End: 2023-06-02

## 2023-10-26 ENCOUNTER — ERX REFILL RESPONSE (OUTPATIENT)
Dept: URBAN - METROPOLITAN AREA CLINIC 113 | Facility: CLINIC | Age: 68
End: 2023-10-26

## 2023-10-26 RX ORDER — FAMOTIDINE 40 MG/1
TAKE 1 TABLET AT BEDTIME (APPOINTMENT IS NEEDED FOR REFILLS) TABLET, FILM COATED ORAL
Qty: 90 TABLET | Refills: 0 | OUTPATIENT

## 2023-10-26 RX ORDER — FAMOTIDINE 40 MG/1
TAKE 1 TABLET AT BEDTIME (APPOINTMENT IS NEEDED FOR REFILLS) TABLET, FILM COATED ORAL
Qty: 90 TABLET | Refills: 3 | OUTPATIENT

## 2025-02-19 ENCOUNTER — TELEPHONE ENCOUNTER (OUTPATIENT)
Dept: URBAN - METROPOLITAN AREA CLINIC 113 | Facility: CLINIC | Age: 70
End: 2025-02-19

## 2025-03-17 ENCOUNTER — TELEPHONE ENCOUNTER (OUTPATIENT)
Dept: URBAN - METROPOLITAN AREA CLINIC 113 | Facility: CLINIC | Age: 70
End: 2025-03-17

## 2025-03-19 ENCOUNTER — OFFICE VISIT (OUTPATIENT)
Dept: URBAN - METROPOLITAN AREA CLINIC 113 | Facility: CLINIC | Age: 70
End: 2025-03-19
Payer: MEDICARE

## 2025-03-19 VITALS
BODY MASS INDEX: 24.81 KG/M2 | WEIGHT: 177.2 LBS | HEIGHT: 71 IN | RESPIRATION RATE: 18 BRPM | SYSTOLIC BLOOD PRESSURE: 120 MMHG | HEART RATE: 77 BPM | DIASTOLIC BLOOD PRESSURE: 64 MMHG | TEMPERATURE: 97.8 F

## 2025-03-19 DIAGNOSIS — K64.4 ANAL SKIN TAG: ICD-10-CM

## 2025-03-19 DIAGNOSIS — K21.00 GASTROESOPHAGEAL REFLUX DISEASE WITH ESOPHAGITIS WITHOUT HEMORRHAGE: ICD-10-CM

## 2025-03-19 DIAGNOSIS — Z86.0100 HISTORY OF COLON POLYPS: ICD-10-CM

## 2025-03-19 DIAGNOSIS — D12.6 TUBULAR ADENOMA OF COLON: ICD-10-CM

## 2025-03-19 DIAGNOSIS — D49.0 IPMN (INTRADUCTAL PAPILLARY MUCINOUS NEOPLASM): ICD-10-CM

## 2025-03-19 DIAGNOSIS — K62.89 PROCTALGIA: ICD-10-CM

## 2025-03-19 PROCEDURE — 99214 OFFICE O/P EST MOD 30 MIN: CPT

## 2025-03-19 RX ORDER — HYDROCORTISONE ACETATE 25 MG/1
1 SUPPOSITORY SUPPOSITORY RECTAL TWICE DAILY
Qty: 24 SUPPOSITORY | Refills: 0 | OUTPATIENT
Start: 2025-03-19 | End: 2025-04-02

## 2025-03-19 RX ORDER — FLUTICASONE PROPIONATE 50 UG/1
1 SPRAY SPRAY, METERED NASAL
Refills: 0 | Status: ACTIVE | COMMUNITY
Start: 2014-08-22

## 2025-03-19 RX ORDER — MELOXICAM 7.5 MG/1
1 TABLET TABLET ORAL
Refills: 0 | Status: ACTIVE | COMMUNITY

## 2025-03-19 RX ORDER — BUPROPION HYDROCHLORIDE 300 MG/1
TAKE 1 TABLET DAILY TABLET, EXTENDED RELEASE ORAL
Refills: 0 | Status: ACTIVE | COMMUNITY

## 2025-03-19 RX ORDER — OXYCODONE AND ACETAMINOPHEN 5; 325 MG/1; MG/1
TABLET ORAL
Qty: 20 | Refills: 0 | Status: ON HOLD | COMMUNITY
Start: 2018-05-10

## 2025-03-19 RX ORDER — TAMSULOSIN HYDROCHLORIDE 0.4 MG/1
1 CAPSULE CAPSULE ORAL ONCE A DAY
Status: ACTIVE | COMMUNITY

## 2025-03-19 RX ORDER — ATORVASTATIN CALCIUM 20 MG/1
TAKE 1 TABLET DAILY TABLET, FILM COATED ORAL
Refills: 0 | Status: ON HOLD | COMMUNITY
Start: 2014-09-04

## 2025-03-19 RX ORDER — AMLODIPINE BESYLATE AND BENAZEPRIL HYDROCHLORIDE 5; 10 MG/1; MG/1
TAKE 1 CAPSULE DAILY CAPSULE ORAL
Refills: 0 | Status: ACTIVE | COMMUNITY

## 2025-03-19 RX ORDER — FAMOTIDINE 40 MG/1
1 TABLET AT BEDTIME TABLET, FILM COATED ORAL ONCE A DAY
Qty: 90 TABLET | Refills: 0 | Status: ON HOLD | COMMUNITY

## 2025-03-19 NOTE — HPI-TODAY'S VISIT:
This is a 66-year-old male with a history of hypertension, hyperlipidemia, psoriatic arthritis, prostate cancer, pancreatic IPMN's (initially found 2015), and adenomatous colon polyps due surveillance in March 2024 presenting for evaluation of rectal discomfort. . He was last seen in the office 7/5/22 his GERD was controlled with famotidine 40 mg at bedtime. Pancreatic cystic lesions determined likely to be IPMN's. Recent MRI demonstrated minimal increase (2 mm) of 2 dominant lesions. Radiology has interpreted stable findings. Recommend repeat MRI in 1 year. Today he presents with 4 weeks of  an "odd feeling in rectum or something stuck in rectum". He does not have any pain, just discomfort. The sensation happens multiple times a day and will improve after a bowel movement, but always comes back. He reports no constipation or straining with bowel movements 2-3 times per day. Denies any blood noted in stool or on paper. He did have prostate cancer and radiation treatment back in 2018. He has never experienced this sensation before. He also reports intermittent LLQ pressure. This sensation seems to happen occasionally prior to bowel movements. There is no pain, just some discomfort.   Interval History.   IPMN's were stable on imaging.  He had recently undergone a surveillance colonoscopy in 2017 demonstrating sigmoid diverticulosis and removal of a 3 mm hyperplastic polyp.  Due to his prior history of adenomas, surveillance was recommended in 5 years.  Interval history: 67-year-old male presents for follow-up after flexible sigmoidoscopy.  He was last seen on 10/11/2022.  He reported rectal pressure and sensation of foreign object in his rectum ongoing for 4 weeks.  CATHRYN was notable for external skin tag.  He does have a notable history of prostate cancer status post radiation therapy in 2018.  He was recommended to increase fiber to 2 tablespoons daily.  He was scheduled for flexible sigmoidoscopy for further evaluation.  Flexible sigmoidoscopy 10/24/2022:Performed without difficulty.  Quality bowel prep was good.  Perianal digital rectal exams were normal.  Removal of a 6 mm polyp in the mid sigmoid colon.  Diverticulosis in the sigmoid colon.  Recommendations included performing a colonoscopy if polyp is adenomatous.  Pathology revealed a tubular adenoma.  A colonoscopy was recommended as there is a 30% chance of additional polyps throughout the remainder of the colon.  He was also recommended psyllium husk 2 tablespoons daily.  He disliked the psyllium husk. THis made his stools mushy and the texture was distasteful. He typically has 1-2 bowel movements daily. He has not experienced the rectal pressure since his flex sig. He denies abdominal pain, nausea, vomiting or dysphagia. GERD is well controlled. NO blood per rectum. His mother had pancreatic cancer.  Interval history, 3/19/2025: Colonoscopy performed in January 2023 due to evidence of tubular adenoma on flex sig revealed good bowel prep, normal TI, sigmoid diverticulosis and two 5 to 7 mm polyps removed from the mid transverse colon and hepatic flexure.  Pathology revealed benign mucosal polyps and surveillance was recommended in 3 years.  He was due for an MRI in 2023 for IPMN surveillance.  This is overdue.  He is followed by Dr. Mi for polycythemia and hemochromatosis.  Phlebotomies would be necessary only if ferritin is more than 100 or if he is symptomatic.  He has had intermittent rectal pain for the last month. It happens any time of day. He does have rectal itching as well. This only occurs after a strenuous bowel movements. His bowels have been fairly regular. Denies blood per rectum. Denies abdominal pain, nausea or vomiting. His niece was recently diagnosed with stage 3 colon cancer at age 34 years old.  He has had intentional weight loss by cutting out carbs and sweets. He had a vocal cord study a few days ago which was normal. This was to follow up after a cervical spine surgery where his esophagus and trachea were shifted. He is awaiting a left arm surgery due tp pinched nerves causing limited motion on this arm.

## 2025-04-03 ENCOUNTER — TELEPHONE ENCOUNTER (OUTPATIENT)
Dept: URBAN - METROPOLITAN AREA CLINIC 113 | Facility: CLINIC | Age: 70
End: 2025-04-03

## 2025-04-04 ENCOUNTER — LAB OUTSIDE AN ENCOUNTER (OUTPATIENT)
Dept: URBAN - METROPOLITAN AREA CLINIC 113 | Facility: CLINIC | Age: 70
End: 2025-04-04

## 2025-04-16 ENCOUNTER — OFFICE VISIT (OUTPATIENT)
Dept: URBAN - METROPOLITAN AREA CLINIC 113 | Facility: CLINIC | Age: 70
End: 2025-04-16

## 2025-04-21 ENCOUNTER — OFFICE VISIT (OUTPATIENT)
Dept: URBAN - METROPOLITAN AREA MEDICAL CENTER 19 | Facility: MEDICAL CENTER | Age: 70
End: 2025-04-21
Payer: MEDICARE

## 2025-04-21 DIAGNOSIS — K86.89 OTHER SPECIFIED DISEASES OF PANCREAS: ICD-10-CM

## 2025-04-21 DIAGNOSIS — K86.2 CYST OF PANCREAS: ICD-10-CM

## 2025-04-21 PROCEDURE — 43242 EGD US FINE NEEDLE BX/ASPIR: CPT | Performed by: INTERNAL MEDICINE

## 2025-04-21 RX ORDER — OXYCODONE AND ACETAMINOPHEN 5; 325 MG/1; MG/1
TABLET ORAL
Qty: 20 | Refills: 0 | Status: ON HOLD | COMMUNITY
Start: 2018-05-10

## 2025-04-21 RX ORDER — BUPROPION HYDROCHLORIDE 300 MG/1
TAKE 1 TABLET DAILY TABLET, EXTENDED RELEASE ORAL
Refills: 0 | Status: ACTIVE | COMMUNITY

## 2025-04-21 RX ORDER — TAMSULOSIN HYDROCHLORIDE 0.4 MG/1
1 CAPSULE CAPSULE ORAL ONCE A DAY
Status: ACTIVE | COMMUNITY

## 2025-04-21 RX ORDER — FLUTICASONE PROPIONATE 50 UG/1
1 SPRAY SPRAY, METERED NASAL
Refills: 0 | Status: ACTIVE | COMMUNITY
Start: 2014-08-22

## 2025-04-21 RX ORDER — FAMOTIDINE 40 MG/1
1 TABLET AT BEDTIME TABLET, FILM COATED ORAL ONCE A DAY
Qty: 90 TABLET | Refills: 0 | Status: ON HOLD | COMMUNITY

## 2025-04-21 RX ORDER — ATORVASTATIN CALCIUM 20 MG/1
TAKE 1 TABLET DAILY TABLET, FILM COATED ORAL
Refills: 0 | Status: ON HOLD | COMMUNITY
Start: 2014-09-04

## 2025-04-21 RX ORDER — AMLODIPINE BESYLATE AND BENAZEPRIL HYDROCHLORIDE 5; 10 MG/1; MG/1
TAKE 1 CAPSULE DAILY CAPSULE ORAL
Refills: 0 | Status: ACTIVE | COMMUNITY

## 2025-04-21 RX ORDER — MELOXICAM 7.5 MG/1
1 TABLET TABLET ORAL
Refills: 0 | Status: ACTIVE | COMMUNITY

## 2025-04-24 ENCOUNTER — TELEPHONE ENCOUNTER (OUTPATIENT)
Dept: URBAN - METROPOLITAN AREA CLINIC 113 | Facility: CLINIC | Age: 70
End: 2025-04-24

## 2025-04-29 ENCOUNTER — TELEPHONE ENCOUNTER (OUTPATIENT)
Dept: URBAN - METROPOLITAN AREA CLINIC 113 | Facility: CLINIC | Age: 70
End: 2025-04-29

## 2025-04-30 ENCOUNTER — TELEPHONE ENCOUNTER (OUTPATIENT)
Dept: URBAN - METROPOLITAN AREA CLINIC 113 | Facility: CLINIC | Age: 70
End: 2025-04-30

## 2025-04-30 RX ORDER — SODIUM, POTASSIUM,MAG SULFATES 17.5-3.13G
354 ML SOLUTION, RECONSTITUTED, ORAL ORAL ONCE
Qty: 354 MILLILITER | Refills: 0 | OUTPATIENT
Start: 2025-04-30 | End: 2025-05-01

## 2025-06-05 ENCOUNTER — CLAIMS CREATED FROM THE CLAIM WINDOW (OUTPATIENT)
Dept: URBAN - METROPOLITAN AREA SURGERY CENTER 25 | Facility: SURGERY CENTER | Age: 70
End: 2025-06-05
Payer: MEDICARE

## 2025-06-05 ENCOUNTER — TELEPHONE ENCOUNTER (OUTPATIENT)
Dept: URBAN - METROPOLITAN AREA CLINIC 113 | Facility: CLINIC | Age: 70
End: 2025-06-05

## 2025-06-05 DIAGNOSIS — K59.4 ANAL SPASM: ICD-10-CM

## 2025-06-05 DIAGNOSIS — K59.4 PROCTALGIA FUGAX: ICD-10-CM

## 2025-06-05 DIAGNOSIS — K62.5 HEMORRHAGE OF ANUS AND RECTUM: ICD-10-CM

## 2025-06-05 DIAGNOSIS — K62.5 RECTAL BLEEDING: ICD-10-CM

## 2025-06-05 PROCEDURE — 45378 DIAGNOSTIC COLONOSCOPY: CPT | Performed by: INTERNAL MEDICINE

## 2025-06-05 PROCEDURE — 45378 DIAGNOSTIC COLONOSCOPY: CPT | Performed by: CLINIC/CENTER

## 2025-06-05 PROCEDURE — 00811 ANES LWR INTST NDSC NOS: CPT | Performed by: ANESTHESIOLOGY

## 2025-06-05 PROCEDURE — 00811 ANES LWR INTST NDSC NOS: CPT | Performed by: NURSE ANESTHETIST, CERTIFIED REGISTERED

## 2025-06-05 RX ORDER — BUPROPION HYDROCHLORIDE 300 MG/1
TAKE 1 TABLET DAILY TABLET, EXTENDED RELEASE ORAL
Refills: 0 | Status: ACTIVE | COMMUNITY

## 2025-06-05 RX ORDER — OXYCODONE AND ACETAMINOPHEN 5; 325 MG/1; MG/1
TABLET ORAL
Qty: 20 | Refills: 0 | Status: ON HOLD | COMMUNITY
Start: 2018-05-10

## 2025-06-05 RX ORDER — AMLODIPINE BESYLATE AND BENAZEPRIL HYDROCHLORIDE 5; 10 MG/1; MG/1
TAKE 1 CAPSULE DAILY CAPSULE ORAL
Refills: 0 | Status: ACTIVE | COMMUNITY

## 2025-06-05 RX ORDER — MELOXICAM 7.5 MG/1
1 TABLET TABLET ORAL
Refills: 0 | Status: ACTIVE | COMMUNITY

## 2025-06-05 RX ORDER — FLUTICASONE PROPIONATE 50 UG/1
1 SPRAY SPRAY, METERED NASAL
Refills: 0 | Status: ACTIVE | COMMUNITY
Start: 2014-08-22

## 2025-06-05 RX ORDER — FAMOTIDINE 40 MG/1
1 TABLET AT BEDTIME TABLET, FILM COATED ORAL ONCE A DAY
Qty: 90 TABLET | Refills: 0 | Status: ON HOLD | COMMUNITY

## 2025-06-05 RX ORDER — TAMSULOSIN HYDROCHLORIDE 0.4 MG/1
1 CAPSULE CAPSULE ORAL ONCE A DAY
Status: ACTIVE | COMMUNITY

## 2025-06-05 RX ORDER — ATORVASTATIN CALCIUM 20 MG/1
TAKE 1 TABLET DAILY TABLET, FILM COATED ORAL
Refills: 0 | Status: ON HOLD | COMMUNITY
Start: 2014-09-04

## 2025-06-17 ENCOUNTER — OFFICE VISIT (OUTPATIENT)
Dept: URBAN - METROPOLITAN AREA CLINIC 113 | Facility: CLINIC | Age: 70
End: 2025-06-17
Payer: MEDICARE

## 2025-06-17 DIAGNOSIS — K21.00 GASTROESOPHAGEAL REFLUX DISEASE WITH ESOPHAGITIS WITHOUT HEMORRHAGE: ICD-10-CM

## 2025-06-17 DIAGNOSIS — D13.6 SEROUS CYSTADENOMA OF PANCREAS: ICD-10-CM

## 2025-06-17 DIAGNOSIS — Z86.0100 HISTORY OF COLON POLYPS: ICD-10-CM

## 2025-06-17 DIAGNOSIS — K64.4 ANAL SKIN TAG: ICD-10-CM

## 2025-06-17 DIAGNOSIS — D49.0 IPMN (INTRADUCTAL PAPILLARY MUCINOUS NEOPLASM): ICD-10-CM

## 2025-06-17 DIAGNOSIS — K62.89 PROCTALGIA: ICD-10-CM

## 2025-06-17 DIAGNOSIS — D12.6 TUBULAR ADENOMA OF COLON: ICD-10-CM

## 2025-06-17 PROBLEM — 783706007: Status: ACTIVE | Noted: 2025-06-17

## 2025-06-17 PROCEDURE — 99214 OFFICE O/P EST MOD 30 MIN: CPT

## 2025-06-17 RX ORDER — TAMSULOSIN HYDROCHLORIDE 0.4 MG/1
1 CAPSULE CAPSULE ORAL ONCE A DAY
Status: ACTIVE | COMMUNITY

## 2025-06-17 RX ORDER — AMLODIPINE BESYLATE AND BENAZEPRIL HYDROCHLORIDE 5; 10 MG/1; MG/1
TAKE 1 CAPSULE DAILY CAPSULE ORAL
Refills: 0 | Status: ACTIVE | COMMUNITY

## 2025-06-17 RX ORDER — FAMOTIDINE 40 MG/1
1 TABLET AT BEDTIME TABLET, FILM COATED ORAL ONCE A DAY
Qty: 90 TABLET | Refills: 0 | Status: ON HOLD | COMMUNITY

## 2025-06-17 RX ORDER — ATORVASTATIN CALCIUM 20 MG/1
TAKE 1 TABLET DAILY TABLET, FILM COATED ORAL
Refills: 0 | Status: ON HOLD | COMMUNITY
Start: 2014-09-04

## 2025-06-17 RX ORDER — MELOXICAM 7.5 MG/1
1 TABLET TABLET ORAL
Refills: 0 | Status: ACTIVE | COMMUNITY

## 2025-06-17 RX ORDER — OXYCODONE AND ACETAMINOPHEN 5; 325 MG/1; MG/1
TABLET ORAL
Qty: 20 | Refills: 0 | Status: ON HOLD | COMMUNITY
Start: 2018-05-10

## 2025-06-17 RX ORDER — BUPROPION HYDROCHLORIDE 300 MG/1
TAKE 1 TABLET DAILY TABLET, EXTENDED RELEASE ORAL
Refills: 0 | Status: ACTIVE | COMMUNITY

## 2025-06-17 RX ORDER — FLUTICASONE PROPIONATE 50 UG/1
1 SPRAY SPRAY, METERED NASAL
Refills: 0 | Status: ACTIVE | COMMUNITY
Start: 2014-08-22

## 2025-06-17 NOTE — HPI-TODAY'S VISIT:
This is a 66-year-old male with a history of hypertension, hyperlipidemia, psoriatic arthritis, prostate cancer, pancreatic IPMN's (initially found 2015), and adenomatous colon polyps due surveillance in March 2024 presenting for evaluation of rectal discomfort. . He was last seen in the office 7/5/22 his GERD was controlled with famotidine 40 mg at bedtime. Pancreatic cystic lesions determined likely to be IPMN's. Recent MRI demonstrated minimal increase (2 mm) of 2 dominant lesions. Radiology has interpreted stable findings. Recommend repeat MRI in 1 year. Today he presents with 4 weeks of  an "odd feeling in rectum or something stuck in rectum". He does not have any pain, just discomfort. The sensation happens multiple times a day and will improve after a bowel movement, but always comes back. He reports no constipation or straining with bowel movements 2-3 times per day. Denies any blood noted in stool or on paper. He did have prostate cancer and radiation treatment back in 2018. He has never experienced this sensation before. He also reports intermittent LLQ pressure. This sensation seems to happen occasionally prior to bowel movements. There is no pain, just some discomfort.   Interval History.   IPMN's were stable on imaging.  He had recently undergone a surveillance colonoscopy in 2017 demonstrating sigmoid diverticulosis and removal of a 3 mm hyperplastic polyp.  Due to his prior history of adenomas, surveillance was recommended in 5 years.  Interval history: 67-year-old male presents for follow-up after flexible sigmoidoscopy.  He was last seen on 10/11/2022.  He reported rectal pressure and sensation of foreign object in his rectum ongoing for 4 weeks.  CATHRYN was notable for external skin tag.  He does have a notable history of prostate cancer status post radiation therapy in 2018.  He was recommended to increase fiber to 2 tablespoons daily.  He was scheduled for flexible sigmoidoscopy for further evaluation.  Flexible sigmoidoscopy 10/24/2022:Performed without difficulty.  Quality bowel prep was good.  Perianal digital rectal exams were normal.  Removal of a 6 mm polyp in the mid sigmoid colon.  Diverticulosis in the sigmoid colon.  Recommendations included performing a colonoscopy if polyp is adenomatous.  Pathology revealed a tubular adenoma.  A colonoscopy was recommended as there is a 30% chance of additional polyps throughout the remainder of the colon.  He was also recommended psyllium husk 2 tablespoons daily.  He disliked the psyllium husk. THis made his stools mushy and the texture was distasteful. He typically has 1-2 bowel movements daily. He has not experienced the rectal pressure since his flex sig. He denies abdominal pain, nausea, vomiting or dysphagia. GERD is well controlled. NO blood per rectum. His mother had pancreatic cancer.  Interval history, 3/19/2025: Colonoscopy performed in January 2023 due to evidence of tubular adenoma on flex sig revealed good bowel prep, normal TI, sigmoid diverticulosis and two 5 to 7 mm polyps removed from the mid transverse colon and hepatic flexure.  Pathology revealed benign mucosal polyps and surveillance was recommended in 3 years.  He was due for an MRI in 2023 for IPMN surveillance.  This is overdue.  He is followed by Dr. Mi for polycythemia and hemochromatosis.  Phlebotomies would be necessary only if ferritin is more than 100 or if he is symptomatic.  He has had intermittent rectal pain for the last month. It happens any time of day. He does have rectal itching as well. This only occurs after a strenuous bowel movements. His bowels have been fairly regular. Denies blood per rectum. Denies abdominal pain, nausea or vomiting. His niece was recently diagnosed with stage 3 colon cancer at age 34 years old.  He has had intentional weight loss by cutting out carbs and sweets. He had a vocal cord study a few days ago which was normal. This was to follow up after a cervical spine surgery where his esophagus and trachea were shifted. He is awaiting a left arm surgery due tp pinched nerves causing limited motion on this arm.  Interval history, 6/17/2025: He was treated for suspected hemorrhoids and if no improvement we would repeat colonoscopy.  He was overdue for a surveillance MRI regarding his IPMN.  MRI of the abdomen with and without contrast 3/28/2025:Complex cystic lesion along the pancreatic body measuring 2.7 cm demonstrating questionable slight enlargement from 2.4 cm on prior exam however no suspicious features.  There was an additional 1.2 cm cystic focus along the uncinate process which is stable.  Findings may reflect sidebranch IPMN's.  Due to enlargement he was recommended EUS.  Endoscopic ultrasound performed by Dr. Kamara on 4/21/2025 showed a 26 x 17 mm oligo locular cyst in the pancreas body which does not clearly communicate with the main pancreatic duct.  There was no main duct dilation, solid lesion or mural nodularity.  FNA was performed with thin clear aspirate.  Specimen sent for CEA, amylase and cytology.  Differential included sidebranch IPMN versus oligo locular serous cystadenoma.  2 unilocular cysts noted in the pancreatic head/uncinate measuring up to 13 mm consistent with sidebranch IPMN's.  5 mm cyst in the pancreatic tail consistent with sidebranch IPMN.  Normal extrahepatic bile duct and gallbladder.  No lymphadenopathy.  Normal exam of the liver.  Pathology showed paucicellular specimen, negative for malignancy.  Fluid CEA levels less than 0.6, amylase less than 30.  Overall findings consistent with a serous cystadenoma which is completely benign.  A repeat MRI/MRCP is recommended in 1 year for surveillance of the other cysts.  Colonoscopy 6/5/2025:Performed without difficulty.  Perianal digital rectal exams were normal.  Quality of bowel prep was inadequate.  TI was normal.  Sigmoid diverticulosis.  No specimens collected.  Repeat colonoscopy in 3 years for screening purposes.  Labs performed 5/12/2025 with Dr. Morales showed normal H/H, platelet count 179.  He has not required phlebotomy since September 2020.  He admits he ate the day of his bowel prep. He also drakn his prep incorrectly. He has no GI complaints at this time. His rectal pain is a random, fleeting ache and not associated with bowel movements. He denies BRBPR.